# Patient Record
Sex: FEMALE | Race: WHITE | NOT HISPANIC OR LATINO | Employment: UNEMPLOYED | URBAN - METROPOLITAN AREA
[De-identification: names, ages, dates, MRNs, and addresses within clinical notes are randomized per-mention and may not be internally consistent; named-entity substitution may affect disease eponyms.]

---

## 2017-02-06 ENCOUNTER — OFFICE VISIT (OUTPATIENT)
Dept: INTERNAL MEDICINE | Facility: CLINIC | Age: 42
End: 2017-02-06

## 2017-02-06 VITALS
SYSTOLIC BLOOD PRESSURE: 110 MMHG | RESPIRATION RATE: 16 BRPM | TEMPERATURE: 98.8 F | DIASTOLIC BLOOD PRESSURE: 78 MMHG | WEIGHT: 182 LBS | OXYGEN SATURATION: 98 % | BODY MASS INDEX: 30.29 KG/M2 | HEART RATE: 97 BPM

## 2017-02-06 DIAGNOSIS — L73.9 FOLLICULITIS: Primary | ICD-10-CM

## 2017-02-06 PROCEDURE — 99213 OFFICE O/P EST LOW 20 MIN: CPT | Performed by: FAMILY MEDICINE

## 2017-02-06 RX ORDER — SULFAMETHOXAZOLE AND TRIMETHOPRIM 800; 160 MG/1; MG/1
1 TABLET ORAL 2 TIMES DAILY
Qty: 60 TABLET | Refills: 1 | Status: SHIPPED | OUTPATIENT
Start: 2017-02-06

## 2017-02-06 NOTE — PROGRESS NOTES
"Subjective     Temitope Mcgovern is a 41 y.o. female, who presents with a chief complaint of   Chief Complaint   Patient presents with   • Hair/Scalp Problem     \"goose eggs\" starting behind left ear radiating around head x 6weeks/tender to touch/some pain       HPI     Pt c/o 6 weeks of waxing and waning tender bumps along her left lower hairline and behind the ear.  They do not drain.  No fevers.  No prior history of similar bumps.    The following portions of the patient's history were reviewed and updated as appropriate: allergies, current medications, past family history, past medical history, past social history, past surgical history and problem list.    Allergies: Review of patient's allergies indicates no known allergies.    Review of Systems    Objective     Wt Readings from Last 3 Encounters:   02/06/17 182 lb (82.6 kg)   10/19/16 196 lb 6.4 oz (89.1 kg)     Temp Readings from Last 3 Encounters:   02/06/17 98.8 °F (37.1 °C) (Oral)   11/21/16 (!) 101.3 °F (38.5 °C) (Oral)     BP Readings from Last 3 Encounters:   02/06/17 110/78   11/21/16 112/78     Pulse Readings from Last 3 Encounters:   02/06/17 97   11/21/16 118     Body mass index is 30.29 kg/(m^2).  SpO2 Readings from Last 3 Encounters:   02/06/17 98%   11/21/16 96%       Physical Exam   Constitutional: She appears well-developed and well-nourished. No distress.   Neurological: She is alert.   Skin: Skin is warm and dry. Rash (mildly tender, non-erythemetous fluctuant nodules up to 5 mm in size behind left ear and along left half of lower hairline.) noted.        Psychiatric: She has a normal mood and affect. Her behavior is normal.   Nursing note and vitals reviewed.        Assessment/Plan   Temitope was seen today for hair/scalp problem.    Diagnoses and all orders for this visit:    Folliculitis  -     sulfamethoxazole-trimethoprim (BACTRIM DS,SEPTRA DS) 800-160 MG per tablet; Take 1 tablet by mouth 2 (Two) Times a Day.      Treat with antibiotics.  " May need dermatology consult if no resolution.  Anticipatory guidance given.    Outpatient Medications Prior to Visit   Medication Sig Dispense Refill   • buPROPion XL (WELLBUTRIN XL) 300 MG 24 hr tablet Take 1 tablet by mouth Daily. 30 tablet 11   • metFORMIN (GLUCOPHAGE) 500 MG tablet Take 2 tablets by mouth 2 (Two) Times a Day With Meals. 120 tablet 11     Facility-Administered Medications Prior to Visit   Medication Dose Route Frequency Provider Last Rate Last Dose   • ibuprofen (ADVIL,MOTRIN) tablet 400 mg  400 mg Oral Q4H PRN EMERALD Porter   400 mg at 11/21/16 1600     New Medications Ordered This Visit   Medications   • sulfamethoxazole-trimethoprim (BACTRIM DS,SEPTRA DS) 800-160 MG per tablet     Sig: Take 1 tablet by mouth 2 (Two) Times a Day.     Dispense:  60 tablet     Refill:  1     [unfilled]  There are no discontinued medications.      Return if symptoms worsen or fail to improve.